# Patient Record
(demographics unavailable — no encounter records)

---

## 2024-10-09 NOTE — ASSESSMENT
[FreeTextEntry1] : 14 year old male here for annual exam. Trial albuterol for asthma.  Routine labs drawn in office. Childhood vaccines up to date. Reviewed healthy lifestyle habits including regular meals, wearing prescribed glasses/contacts, safe sex whenever that time comes.  All questions answered, patient expressed understanding, and in agreement with plan.

## 2024-10-09 NOTE — HISTORY OF PRESENT ILLNESS
[de-identified] : 14 year old male here for annual exam.  Presents with mother who is also a patient.  No acute concerns. Saw cardiology for chest discomfort but EKG, echo reassuring unlikely cardiac origin.  Possible history of exercise-induced asthma. Recently applied for [employer].  9th grade, going well.  Has glasses but doesn't wear them. Never sexually active. Denies tobacco, alcohol, other drugs.

## 2024-10-09 NOTE — HEALTH RISK ASSESSMENT
[0] : 2) Feeling down, depressed, or hopeless: Not at all (0) [PHQ-2 Negative - No further assessment needed] : PHQ-2 Negative - No further assessment needed [Never] : Never [No] : No [[id number]] : 0 [HIV test declined] : HIV test declined [Hepatitis C test declined] : Hepatitis C test declined [Change in mental status noted] : No change in mental status noted [With Family] : lives with family [Student] : student [Single] : single [Sexually Active] : not sexually active [Fully functional (bathing, dressing, toileting, transferring, walking, feeding)] : Fully functional (bathing, dressing, toileting, transferring, walking, feeding) [Fully functional (using the telephone, shopping, preparing meals, housekeeping, doing laundry, using] : Fully functional and needs no help or supervision to perform IADLs (using the telephone, shopping, preparing meals, housekeeping, doing laundry, using transportation, managing medications and managing finances)

## 2024-11-12 NOTE — ASSESSMENT
[Educated Patient & Family about Diagnosis] : educated the patient and family about the diagnosis [FreeTextEntry1] : MAUYR  is a 14 year  old  here for consultation for heartburn and periumbilical/lower abdominal pain for the past several months.  Had chest pain       6 months ago.  Was seen at the [facility] with a normal chest x-ray and EKG. labs done by the PCP in October noting a triglycerides of 233.  CMP and thyroid screen and A1c were normal.  Did have a 25 pound weight gain since May 2024  Differential diagnosis for abdominal plain includes GERD, infection, inflammation, constipation, autoimmune disorder, pancreatitis, hepatitis, IBS or functional abdominal pain           Recommendations Labs: as below medications:  IB guard 2 capsules up to 3 times a day, Iberogast up to 3 times a day.  Omeprazole 20 mg daily.  Abdominal ultrasound abdominal breathing Continue lactose-free diet Would recommend decreasing sugary drinks and decrease carbohydrate intake Follow-up in 4 to 8 weeks   This note was done with a voice recognition transcription software and any typos are related to this rather than medical error.

## 2024-11-12 NOTE — CONSULT LETTER
[Dear  ___] : Dear  [unfilled], [Consult Letter:] : I had the pleasure of evaluating your patient, [unfilled]. [Please see my note below.] : Please see my note below. [Consult Closing:] : Thank you very much for allowing me to participate in the care of this patient.  If you have any questions, please do not hesitate to contact me. [Sincerely,] : Sincerely, [FreeTextEntry3] : Ese Diez MD [facility] physician partners Pediatric gastroenterologist , [facility] of medicine at [facility] [phone] [email or web address]

## 2024-11-12 NOTE — REASON FOR VISIT
[Consultation] : a consultation visit [Patient] : patient [Mother] : mother [FreeTextEntry2] : ACID REFLUX

## 2024-11-12 NOTE — HISTORY OF PRESENT ILLNESS
[de-identified] : EPHRAIM WHITE , is  a 14 year old male here for initial consultation for throat burning and abdominal pain for the past several months.  throat burning has been going on for a few months .   not related to meals.  occurs weekly. also c/o abd pain for the past 2-3 months. hurts in the AM and later on in the day.   Pain is in the periumbilical or lower regions.  Described as pushing.  Last for an hour and resolves on its own.  Does not occur at the same time as the throat burning symptoms. Denies nausea vomiting or dysphagia. stools are type III or IV daily.  no blood or mucus noted in the stool.  Denies  Ulcerative colitis- ileostomy permanent.  had colectomy.  Father  in his 40s of a heart attack.  Was in the ER in May 2024 for chest pain.  He had a normal EKG and chest x-ray.  Chest pain was felt to be likely to reflux.  Recommended Pepcid which did not help as per Ephraim.  Seen by cardiology as well.  Normal echo Diet wise has a varied diet.  Is lactose intolerant and drinks Lactaid milk.   No dietary limitations secondary to pain. Gained 25 pounds over the last 6 months.  Is working out more and creasing muscle mass.  Denies using supplements  Denies constipation, diarrhea, easy bleeding or bruising, jaundice, hematochezia, melena, recurrent fevers or infection, mouth sores, joint swelling, vision changes, unintentional weight loss.   Denies choking, dysphagia, cyanosis with feeds.

## 2024-11-12 NOTE — HISTORY OF PRESENT ILLNESS
[de-identified] : EPHRAIM WHITE , is  a 14 year old male here for initial consultation for throat burning and abdominal pain for the past several months.  throat burning has been going on for a few months .   not related to meals.  occurs weekly. also c/o abd pain for the past 2-3 months. hurts in the AM and later on in the day.   Pain is in the periumbilical or lower regions.  Described as pushing.  Last for an hour and resolves on its own.  Does not occur at the same time as the throat burning symptoms. Denies nausea vomiting or dysphagia. stools are type III or IV daily.  no blood or mucus noted in the stool.  Denies  Ulcerative colitis- ileostomy permanent.  had colectomy.  Father  in his 40s of a heart attack.  Was in the ER in May 2024 for chest pain.  He had a normal EKG and chest x-ray.  Chest pain was felt to be likely to reflux.  Recommended Pepcid which did not help as per Ephraim.  Seen by cardiology as well.  Normal echo Diet wise has a varied diet.  Is lactose intolerant and drinks Lactaid milk.   No dietary limitations secondary to pain. Gained 25 pounds over the last 6 months.  Is working out more and creasing muscle mass.  Denies using supplements  Denies constipation, diarrhea, easy bleeding or bruising, jaundice, hematochezia, melena, recurrent fevers or infection, mouth sores, joint swelling, vision changes, unintentional weight loss.   Denies choking, dysphagia, cyanosis with feeds.

## 2024-11-12 NOTE — CONSULT LETTER
[Dear  ___] : Dear  [unfilled], [Consult Letter:] : I had the pleasure of evaluating your patient, [unfilled]. [Please see my note below.] : Please see my note below. [Consult Closing:] : Thank you very much for allowing me to participate in the care of this patient.  If you have any questions, please do not hesitate to contact me. [Sincerely,] : Sincerely, [FreeTextEntry3] : Ese Diez MD [facility] physician partners Pediatric gastroenterologist , [facility] of medicine at [facility] [phone] [email or web address]

## 2024-11-12 NOTE — ASSESSMENT
[Educated Patient & Family about Diagnosis] : educated the patient and family about the diagnosis [FreeTextEntry1] : MAYUR  is a 14 year  old  here for consultation for heartburn and periumbilical/lower abdominal pain for the past several months.  Had chest pain       6 months ago.  Was seen at the [facility] with a normal chest x-ray and EKG. labs done by the PCP in October noting a triglycerides of 233.  CMP and thyroid screen and A1c were normal.  Did have a 25 pound weight gain since May 2024  Differential diagnosis for abdominal plain includes GERD, infection, inflammation, constipation, autoimmune disorder, pancreatitis, hepatitis, IBS or functional abdominal pain           Recommendations Labs: as below medications:  IB guard 2 capsules up to 3 times a day, Iberogast up to 3 times a day.  Omeprazole 20 mg daily.  Abdominal ultrasound abdominal breathing Continue lactose-free diet Would recommend decreasing sugary drinks and decrease carbohydrate intake Follow-up in 4 to 8 weeks   This note was done with a voice recognition transcription software and any typos are related to this rather than medical error.

## 2025-02-03 NOTE — HEALTH RISK ASSESSMENT
[No] : No [0] : 2) Feeling down, depressed, or hopeless: Not at all (0) [PHQ-2 Negative - No further assessment needed] : PHQ-2 Negative - No further assessment needed [Never] : Never [[id number]] : 0

## 2025-02-03 NOTE — HISTORY OF PRESENT ILLNESS
[de-identified] : 14 year old male here with mother for school form completion. annual exam. Following with Peds GI for abdominal pains. Endoscopy normal. Stool PCR positive for campylobacter and enteropathogenic E. coli (EPEC). GI symptoms now resolved.   Reports received routine childhood vaccinations.  Had influenza A last month.  Still has not seen orthodontist or optometrist for braces/glasses.   In high school, 9th grade going well.  [employer]. Never sexually active. Denies tobacco, alcohol, other drugs.

## 2025-02-03 NOTE — HEALTH RISK ASSESSMENT
[No] : No [0] : 2) Feeling down, depressed, or hopeless: Not at all (0) [PHQ-2 Negative - No further assessment needed] : PHQ-2 Negative - No further assessment needed [Never] : Never [[id number]] : 0

## 2025-02-03 NOTE — HISTORY OF PRESENT ILLNESS
[de-identified] : 14 year old male here with mother for school form completion. annual exam. Following with Peds GI for abdominal pains. Endoscopy normal. Stool PCR positive for campylobacter and enteropathogenic E. coli (EPEC). GI symptoms now resolved.   Reports received routine childhood vaccinations.  Had influenza A last month.  Still has not seen orthodontist or optometrist for braces/glasses.   In high school, 9th grade going well.  [employer]. Never sexually active. Denies tobacco, alcohol, other drugs.

## 2025-02-03 NOTE — ASSESSMENT
[FreeTextEntry1] : School form completed and returned to patient. Advised orthodontist and optometrist evaluations for eye/dental care.  All other vaccines up to date. Recommended use of albuterol prior to gym exercises.  Advised use of shoe cushions to assist with skin abrasions due to shoe wear and tear.

## 2025-02-03 NOTE — PHYSICAL EXAM
[No Edema] : there was no peripheral edema [Soft] : abdomen soft [Non-distended] : non-distended [Grossly Normal Strength/Tone] : grossly normal strength/tone [Coordination Grossly Intact] : coordination grossly intact [No Focal Deficits] : no focal deficits [Normal Gait] : normal gait [Alert and Oriented x3] : oriented to person, place, and time [Normal] : affect was normal and insight and judgment were intact

## 2025-03-26 NOTE — HISTORY OF PRESENT ILLNESS
[de-identified] : 14-year-old male new patient here for follow-up.  Last seen 1 month ago for annual exam.  Today with concerns of